# Patient Record
Sex: MALE | Race: WHITE | NOT HISPANIC OR LATINO | URBAN - METROPOLITAN AREA
[De-identification: names, ages, dates, MRNs, and addresses within clinical notes are randomized per-mention and may not be internally consistent; named-entity substitution may affect disease eponyms.]

---

## 2022-07-27 ENCOUNTER — APPOINTMENT (RX ONLY)
Dept: URBAN - METROPOLITAN AREA CLINIC 341 | Facility: CLINIC | Age: 52
Setting detail: DERMATOLOGY
End: 2022-07-27

## 2022-07-27 DIAGNOSIS — L30.9 DERMATITIS, UNSPECIFIED: ICD-10-CM | Status: INADEQUATELY CONTROLLED

## 2022-07-27 PROCEDURE — 11103 TANGNTL BX SKIN EA SEP/ADDL: CPT

## 2022-07-27 PROCEDURE — ? BIOPSY BY PUNCH METHOD

## 2022-07-27 PROCEDURE — 11104 PUNCH BX SKIN SINGLE LESION: CPT

## 2022-07-27 PROCEDURE — ? BIOPSY BY SHAVE METHOD

## 2022-07-27 PROCEDURE — ? PRESCRIPTION

## 2022-07-27 PROCEDURE — ? PRESCRIPTION MEDICATION MANAGEMENT

## 2022-07-27 RX ORDER — CLOBETASOL PROPIONATE 0.5 MG/G
OINTMENT TOPICAL
Qty: 60 | Refills: 1 | Status: ERX | COMMUNITY
Start: 2022-07-27

## 2022-07-27 RX ORDER — PREDNISONE 5 MG/1
TABLET ORAL
Qty: 98 | Refills: 0 | Status: ERX | COMMUNITY
Start: 2022-07-27

## 2022-07-27 RX ADMIN — CLOBETASOL PROPIONATE: 0.5 OINTMENT TOPICAL at 00:00

## 2022-07-27 RX ADMIN — PREDNISONE: 5 TABLET ORAL at 00:00

## 2022-07-27 ASSESSMENT — LOCATION DETAILED DESCRIPTION DERM
LOCATION DETAILED: LEFT PROXIMAL DORSAL MIDDLE FINGER
LOCATION DETAILED: LEFT MIDDLE PROXIMAL INTERPHALANGEAL JOINT

## 2022-07-27 ASSESSMENT — LOCATION ZONE DERM: LOCATION ZONE: FINGER

## 2022-07-27 ASSESSMENT — LOCATION SIMPLE DESCRIPTION DERM: LOCATION SIMPLE: LEFT MIDDLE FINGER

## 2022-07-27 NOTE — PROCEDURE: PRESCRIPTION MEDICATION MANAGEMENT
Render In Strict Bullet Format?: No
Initiate Treatment: clobetasol 0.05 % topical cream Apply to the affected areas twice daily for 2-3 weeks then as needed for flares\\n\\nprednisone 5 mg tablet 21 day taper. Patient given directions
Detail Level: Zone

## 2022-07-27 NOTE — PROCEDURE: BIOPSY BY PUNCH METHOD
Detail Level: Detailed
Was A Bandage Applied: Yes
Punch Size In Mm: 3
Biopsy Type: DIF (perilesional)
Anesthesia Type: 1% lidocaine with epinephrine
Anesthesia Volume In Cc (Will Not Render If 0): 0.5
Additional Anesthesia Volume In Cc (Will Not Render If 0): 0
Hemostasis: None
Epidermal Sutures: 4-0 Ethilon
Wound Care: Petrolatum
Dressing: bandage
Suture Removal: 14 days
Patient Will Remove Sutures At Home?: No
Lab: 6
Consent: Written consent was obtained and risks were reviewed including but not limited to scarring, infection, bleeding, scabbing, incomplete removal, nerve damage and allergy to anesthesia.
Post-Care Instructions: I reviewed with the patient in detail post-care instructions. Patient is to keep the biopsy site dry overnight, and then apply bacitracin twice daily until healed. Patient may apply hydrogen peroxide soaks to remove any crusting.
Home Suture Removal Text: Patient was provided a home suture removal kit and will remove their sutures at home.  If they have any questions or difficulties they will call the office.
Notification Instructions: Patient will be notified of biopsy results. However, patient instructed to call the office if not contacted within 2 weeks.
Billing Type: Third-Party Bill
Information: Selecting Yes will display possible errors in your note based on the variables you have selected. This validation is only offered as a suggestion for you. PLEASE NOTE THAT THE VALIDATION TEXT WILL BE REMOVED WHEN YOU FINALIZE YOUR NOTE. IF YOU WANT TO FAX A PRELIMINARY NOTE YOU WILL NEED TO TOGGLE THIS TO 'NO' IF YOU DO NOT WANT IT IN YOUR FAXED NOTE.

## 2022-07-29 ENCOUNTER — APPOINTMENT (RX ONLY)
Dept: URBAN - METROPOLITAN AREA CLINIC 341 | Facility: CLINIC | Age: 52
Setting detail: DERMATOLOGY
End: 2022-07-29

## 2022-07-29 DIAGNOSIS — L30.9 DERMATITIS, UNSPECIFIED: ICD-10-CM | Status: INADEQUATELY CONTROLLED

## 2022-07-29 PROCEDURE — ? PRESCRIPTION MEDICATION MANAGEMENT

## 2022-07-29 PROCEDURE — 99212 OFFICE O/P EST SF 10 MIN: CPT

## 2022-07-29 NOTE — PROCEDURE: PRESCRIPTION MEDICATION MANAGEMENT
Render In Strict Bullet Format?: No
Continue Regimen: clobetasol 0.05 % topical cream Apply to the affected areas twice daily for 2-3 weeks then as needed for flares\\n\\nprednisone 5 mg tablet 21 day taper. Patient given directions\\nDoxycycline (PCP)\\nVALTREX (PCP)
Detail Level: Zone

## 2022-08-05 ENCOUNTER — APPOINTMENT (RX ONLY)
Dept: URBAN - METROPOLITAN AREA CLINIC 341 | Facility: CLINIC | Age: 52
Setting detail: DERMATOLOGY
End: 2022-08-05

## 2022-08-05 DIAGNOSIS — Z48.02 ENCOUNTER FOR REMOVAL OF SUTURES: ICD-10-CM

## 2022-08-05 DIAGNOSIS — L30.9 DERMATITIS, UNSPECIFIED: ICD-10-CM | Status: IMPROVED

## 2022-08-05 PROCEDURE — 99214 OFFICE O/P EST MOD 30 MIN: CPT

## 2022-08-05 PROCEDURE — ? COUNSELING

## 2022-08-05 PROCEDURE — ? SUTURE REMOVAL (NO GLOBAL PERIOD)

## 2022-08-05 PROCEDURE — ? PRESCRIPTION MEDICATION MANAGEMENT

## 2022-08-05 ASSESSMENT — LOCATION SIMPLE DESCRIPTION DERM
LOCATION SIMPLE: LEFT MIDDLE FINGER
LOCATION SIMPLE: LEFT THUMB

## 2022-08-05 ASSESSMENT — LOCATION DETAILED DESCRIPTION DERM
LOCATION DETAILED: LEFT PROXIMAL ULNAR THUMB
LOCATION DETAILED: LEFT MIDDLE PROXIMAL INTERPHALANGEAL JOINT
LOCATION DETAILED: LEFT PROXIMAL DORSAL MIDDLE FINGER

## 2022-08-05 ASSESSMENT — LOCATION ZONE DERM: LOCATION ZONE: FINGER

## 2022-08-05 NOTE — PROCEDURE: PRESCRIPTION MEDICATION MANAGEMENT
Render In Strict Bullet Format?: No
Continue Regimen: clobetasol 0.05 % topical cream Apply to the affected areas twice daily for 2-3 weeks then as needed for flares\\n\\nFinish prednisone 5 mg tablet 21 day taper. Patient given directions
Detail Level: Zone

## 2022-08-17 ENCOUNTER — RX ONLY (OUTPATIENT)
Age: 52
Setting detail: RX ONLY
End: 2022-08-17

## 2022-08-17 RX ORDER — DOXYCYCLINE HYCLATE 100 MG/1
CAPSULE, GELATIN COATED ORAL
Qty: 20 | Refills: 0 | Status: ERX | COMMUNITY
Start: 2022-08-17

## 2024-12-23 NOTE — PROCEDURE: BIOPSY BY SHAVE METHOD
"  Subjective:      Patient ID: Rudy Cha is a 37 y.o. male.    Vitals:  vitals were not taken for this visit.     Chief Complaint: Emesis and Abdominal Pain      Visit Type: TELE AUDIOVISUAL    Present with the patient at the time of consultation: TELEMED PRESENT WITH PATIENT: None [place], [place]     History reviewed. No pertinent past medical history.  Past Surgical History:   Procedure Laterality Date    MOUTH SURGERY  age 14    orthodontic     Review of patient's allergies indicates:  No Known Allergies  Current Outpatient Medications on File Prior to Visit   Medication Sig Dispense Refill    amoxicillin-clavulanate 875-125mg (AUGMENTIN) 875-125 mg per tablet Take 1 tablet by mouth 2 (two) times daily. 20 tablet 0    ibuprofen (ADVIL,MOTRIN) 600 MG tablet Take 1 tablet (600 mg total) by mouth every 6 (six) hours as needed for Pain. (Patient not taking: Reported on 5/21/2023) 20 tablet 0    ondansetron (ZOFRAN-ODT) 4 MG TbDL Take 1 tablet (4 mg total) by mouth every 6 (six) hours as needed (nausea). 21 tablet 0     No current facility-administered medications on file prior to visit.     Family History   Problem Relation Name Age of Onset    Hypothyroidism Mother      Hyperlipidemia Father      Hyperlipidemia Brother  28    No Known Problems Maternal Grandmother      No Known Problems Brother      Cancer Neg Hx      Heart attack Neg Hx      Stroke Neg Hx             Ohs Peq Odvv Intake    12/23/2024  5:56 AM CST - Filed by Patient   What is your current physical address in the event of a medical emergency? [address] [place], [place] [id number]   Are you able to take your vital signs? No   Please attach any relevant images or files    Is your employer contracted with [facility]? No         Pt presents with c/o moderate abdominal pain for over 10 hours with associated nausea and vomiting, unable to lay down. States pain unlike "any other pain I have had before". He denies any fever currently. Noted to still have "
"appendix and gallbladder.         Constitution: Negative for fever.   Cardiovascular:  Negative for chest pain.   Respiratory:  Negative for shortness of breath.    Gastrointestinal:  Positive for abdominal pain, nausea and vomiting.        Objective:   The physical exam was conducted virtually.  Physical Exam   Constitutional: He is oriented to person, place, and time. He appears ill.   HENT:   Ears:   Right Ear: External ear normal.   Left Ear: External ear normal.   Neck: Neck supple.   Pulmonary/Chest: Effort normal. No respiratory distress.   Neurological: He is alert and oriented to person, place, and time.       Assessment:     1. Abdominal pain, unspecified abdominal location    2. Nausea and vomiting, unspecified vomiting type        Plan:   Noted abdominal pain x 10 hours, with n/v, "not like other pain" with nv  DDX : gallbladder, appendicitis,   Recommend in person eval at local ER, pt will go to [facility] [place]       Abdominal pain, unspecified abdominal location    Nausea and vomiting, unspecified vomiting type      -Follow up in person with the ER     -You must understand that you've received a Telehealth Urgent Care treatment only and that the patient may be released before all their medical problems are known or treated.     -You, the patient will arrange for follow up care as instructed.                     "
Detail Level: Detailed
Depth Of Biopsy: dermis
Was A Bandage Applied: Yes
Size Of Lesion In Cm: 0
Biopsy Type: H and E
Biopsy Method: Dermablade
Anesthesia Type: 2% Xylocaine with 1:100,000 epinephrine
Anesthesia Volume In Cc (Will Not Render If 0): 0.5
Hemostasis: Aluminum Chloride
Wound Care: Aquaphor
Dressing: bandage
Destruction After The Procedure: No
Type Of Destruction Used: Curettage
Curettage Text: The wound bed was treated with curettage after the biopsy was performed.
Cryotherapy Text: The wound bed was treated with cryotherapy after the biopsy was performed.
Electrodesiccation Text: The wound bed was treated with electrodesiccation after the biopsy was performed.
Electrodesiccation And Curettage Text: The wound bed was treated with electrodesiccation and curettage after the biopsy was performed.
Silver Nitrate Text: The wound bed was treated with silver nitrate after the biopsy was performed.
Lab: 6
Lab Facility: [id number]
Consent: VERBAL consent was obtained and risks were reviewed including but not limited to scarring, infection, bleeding, scabbing, incomplete removal, nerve damage and allergy to anesthesia.
Post-Care Instructions: I reviewed with the patient in detail post-care instructions. Patient is to keep the biopsy site dry overnight, and then apply Aquaphor twice daily until healed.
Notification Instructions: Return in office x 2 weeks for biopsy results.
Billing Type: Third-Party Bill
Information: Selecting Yes will display possible errors in your note based on the variables you have selected. This validation is only offered as a suggestion for you. PLEASE NOTE THAT THE VALIDATION TEXT WILL BE REMOVED WHEN YOU FINALIZE YOUR NOTE. IF YOU WANT TO FAX A PRELIMINARY NOTE YOU WILL NEED TO TOGGLE THIS TO 'NO' IF YOU DO NOT WANT IT IN YOUR FAXED NOTE.